# Patient Record
Sex: FEMALE | Race: WHITE | NOT HISPANIC OR LATINO | URBAN - METROPOLITAN AREA
[De-identification: names, ages, dates, MRNs, and addresses within clinical notes are randomized per-mention and may not be internally consistent; named-entity substitution may affect disease eponyms.]

---

## 2018-06-07 ENCOUNTER — POST-OPERATIVE VISIT (OUTPATIENT)
Dept: URBAN - METROPOLITAN AREA CLINIC 48 | Facility: CLINIC | Age: 73
End: 2018-06-07
Payer: MEDICARE

## 2018-06-07 DIAGNOSIS — Z09 ENCNTR FOR F/U EXAM AFT TRTMT FOR COND OTH THAN MALIG NEOPLM: Primary | ICD-10-CM

## 2018-06-07 PROCEDURE — 99024 POSTOP FOLLOW-UP VISIT: CPT | Performed by: OPHTHALMOLOGY

## 2018-06-07 ASSESSMENT — INTRAOCULAR PRESSURE
OS: 11
OD: 11

## 2018-06-28 ENCOUNTER — OFFICE VISIT (OUTPATIENT)
Dept: URBAN - METROPOLITAN AREA CLINIC 48 | Facility: CLINIC | Age: 73
End: 2018-06-28
Payer: MEDICARE

## 2018-06-28 DIAGNOSIS — H11.31 SUBCONJUNCTIVAL BLEEDING OF RIGHT EYE: Primary | ICD-10-CM

## 2018-06-28 PROCEDURE — 92012 INTRM OPH EXAM EST PATIENT: CPT | Performed by: OPHTHALMOLOGY

## 2018-06-28 ASSESSMENT — INTRAOCULAR PRESSURE: OD: 12

## 2018-06-28 NOTE — IMPRESSION/PLAN
Impression: Subconjunctival bleeding of right eye: H11.31. Plan: reassurance no evidance of infection. lubricating drops 1-4 times a day OD, cool compresses. keep future appts. 1 week Dr. Beck Villeda for discomfort in surgery area per patient.

## 2018-10-23 ENCOUNTER — OFFICE VISIT (OUTPATIENT)
Dept: URBAN - METROPOLITAN AREA CLINIC 48 | Facility: CLINIC | Age: 73
End: 2018-10-23
Payer: MEDICARE

## 2018-10-23 PROCEDURE — 92014 COMPRE OPH EXAM EST PT 1/>: CPT | Performed by: OPHTHALMOLOGY

## 2018-10-23 ASSESSMENT — INTRAOCULAR PRESSURE
OS: 14
OD: 13

## 2018-10-23 NOTE — IMPRESSION/PLAN
Impression: Diplopia: H53.2. Primary gaze: ET flick Up gaze: ortho
down gaze: ortho Right gaze: 6ET Left gaze: 8ET Plan: Mild double vision, no imaging at this time. If any neurological symptoms, patient needs to let us know right away. discussed diagnosis with patient, patient understood. 


RTC 3 months follow up

## 2019-01-29 ENCOUNTER — OFFICE VISIT (OUTPATIENT)
Dept: URBAN - METROPOLITAN AREA CLINIC 48 | Facility: CLINIC | Age: 74
End: 2019-01-29
Payer: MEDICARE

## 2019-01-29 PROCEDURE — 99214 OFFICE O/P EST MOD 30 MIN: CPT | Performed by: OPHTHALMOLOGY

## 2019-01-29 RX ORDER — MULTIVIT-MIN/FA/LYCOPEN/LUTEIN .4-300-25
TABLET ORAL
Qty: 0 | Refills: 0 | Status: INACTIVE
Start: 2019-01-29 | End: 2019-01-29

## 2019-01-29 ASSESSMENT — INTRAOCULAR PRESSURE
OS: 14
OD: 15

## 2019-01-29 NOTE — IMPRESSION/PLAN
Impression: Diplopia: H53.2. Primary gaze- ET flick Left gaze- 4ET Right gaze 4ET
UP gaze - 2ET Down gaze ET flick Near gaze- Ortho Plan: No history of headaches, no ataxia or FND Patient likely has processive weakness of her lateral rectus muscles. Will obtain imaging (MRI Brain with and without contrast) to rule out intracranial pathology. No MG symptoms and no ptosis. Refer patient to Dr. Alex Christesnen at [facility] in [place]. RTC 2 month follow up with imaging results

## 2019-04-18 ENCOUNTER — OFFICE VISIT (OUTPATIENT)
Dept: URBAN - METROPOLITAN AREA CLINIC 48 | Facility: CLINIC | Age: 74
End: 2019-04-18
Payer: MEDICARE

## 2019-04-18 DIAGNOSIS — H26.492 OTHER SECONDARY CATARACT, LEFT EYE: ICD-10-CM

## 2019-04-18 DIAGNOSIS — H53.2 DIPLOPIA: Primary | ICD-10-CM

## 2019-04-18 PROCEDURE — 92012 INTRM OPH EXAM EST PATIENT: CPT | Performed by: OPHTHALMOLOGY

## 2019-04-18 NOTE — IMPRESSION/PLAN
Impression: Diplopia: H53.2. Primary gaze- ET flick Left gaze- ET Right gaze ET
UP gaze - ET Down gaze ET 
GOT ET flick on all gazes Plan: No history of headaches, no ataxia or FND Diplopia stable with prisms Patient seeing Dr. Samantha Gibbs Ophthalmologist on [facility]

## 2019-07-03 ENCOUNTER — OFFICE VISIT (OUTPATIENT)
Dept: URBAN - METROPOLITAN AREA CLINIC 48 | Facility: CLINIC | Age: 74
End: 2019-07-03
Payer: MEDICARE

## 2019-07-03 PROCEDURE — 92014 COMPRE OPH EXAM EST PT 1/>: CPT | Performed by: OPHTHALMOLOGY

## 2019-07-03 ASSESSMENT — INTRAOCULAR PRESSURE
OS: 12
OD: 14
OD: 13
OS: 9

## 2019-07-03 NOTE — IMPRESSION/PLAN
Impression: Other secondary cataract of left eye: H26.492. Plan: Risks, benefits, alternatives, and expectations of YAG capsulotomy were discussed. The patient desires a YAG capsulotomy in the left eye. Schedule YAG capsulotomy in the left eye. Patient  would like too wait  longer for the laser. 


RTC 1 year  Yag elia

## 2020-06-30 ENCOUNTER — OFFICE VISIT (OUTPATIENT)
Dept: URBAN - METROPOLITAN AREA CLINIC 48 | Facility: CLINIC | Age: 75
End: 2020-06-30
Payer: MEDICARE

## 2020-06-30 DIAGNOSIS — H43.813 VITREOUS DEGENERATION, BILATERAL: ICD-10-CM

## 2020-06-30 PROCEDURE — 92014 COMPRE OPH EXAM EST PT 1/>: CPT | Performed by: OPHTHALMOLOGY

## 2020-06-30 ASSESSMENT — INTRAOCULAR PRESSURE
OS: 12
OD: 12

## 2020-06-30 NOTE — IMPRESSION/PLAN
Impression: Vitreous degeneration, bilateral: H43.813. Plan: Posterior vitreous detachment accounts for the patient's complaints. There is no evidence of retinal pathology. All signs and risks of retinal detachment and tears were discussed in detail. Patient instructed to call the office immediately if any symptoms noted.   

RTC  1 year DE ( long exam)

## 2020-06-30 NOTE — IMPRESSION/PLAN
Impression: Other secondary cataract, left eye: H26.492. Plan: small trace RTC 1 year yag eval ( long exam)

## 2021-08-24 ENCOUNTER — OFFICE VISIT (OUTPATIENT)
Dept: URBAN - METROPOLITAN AREA CLINIC 48 | Facility: CLINIC | Age: 76
End: 2021-08-24
Payer: MEDICARE

## 2021-08-24 PROCEDURE — 92014 COMPRE OPH EXAM EST PT 1/>: CPT | Performed by: OPHTHALMOLOGY

## 2021-08-24 ASSESSMENT — INTRAOCULAR PRESSURE
OD: 12
OS: 12

## 2021-08-24 NOTE — IMPRESSION/PLAN
Impression: Other secondary cataract of left eye: H26.492. Plan: Risks, benefits, alternatives, and expectations of YAG capsulotomy were discussed. The patient desires a YAG capsulotomy in the left eye.  
Schedule YAG capsulotomy in the left eye with 1 day PO1 then 1 week PO. RL 2

## 2021-08-26 ENCOUNTER — SURGERY (OUTPATIENT)
Dept: URBAN - METROPOLITAN AREA SURGERY 26 | Facility: SURGERY | Age: 76
End: 2021-08-26
Payer: MEDICARE

## 2021-08-26 PROCEDURE — 66821 AFTER CATARACT LASER SURGERY: CPT | Performed by: OPHTHALMOLOGY

## 2021-09-02 ENCOUNTER — POST-OPERATIVE VISIT (OUTPATIENT)
Dept: URBAN - METROPOLITAN AREA CLINIC 48 | Facility: CLINIC | Age: 76
End: 2021-09-02
Payer: MEDICARE

## 2021-09-02 DIAGNOSIS — Z48.810 ENCOUNTER FOR SURGICAL AFTERCARE FOLLOWING SURGERY ON A SENSE ORGAN: Primary | ICD-10-CM

## 2021-09-02 PROCEDURE — 99024 POSTOP FOLLOW-UP VISIT: CPT | Performed by: STUDENT IN AN ORGANIZED HEALTH CARE EDUCATION/TRAINING PROGRAM

## 2021-09-02 ASSESSMENT — INTRAOCULAR PRESSURE: OS: 10

## 2021-09-02 NOTE — IMPRESSION/PLAN
Impression: S/P YAG Capsulotomy (Yttrium Aluminum Lueders) OS - 7 Days. Encounter for surgical aftercare following surgery on a sense organ  Z48.810. Plan: Left eye is doing well, Open PC, IOP within good range. 


RTC 1 year for DFE with Dr. Misha Alvarado

## 2023-05-21 NOTE — IMPRESSION/PLAN
Impression: Other secondary cataract, left eye: H26.492.  Plan: RTC 3 months yag eval Left message to return call.